# Patient Record
Sex: FEMALE | Race: WHITE | NOT HISPANIC OR LATINO | URBAN - NONMETROPOLITAN AREA
[De-identification: names, ages, dates, MRNs, and addresses within clinical notes are randomized per-mention and may not be internally consistent; named-entity substitution may affect disease eponyms.]

---

## 2023-04-04 ENCOUNTER — OFFICE (OUTPATIENT)
Dept: URBAN - NONMETROPOLITAN AREA CLINIC 1 | Facility: CLINIC | Age: 53
End: 2023-04-04

## 2023-04-04 VITALS
SYSTOLIC BLOOD PRESSURE: 138 MMHG | HEIGHT: 63 IN | SYSTOLIC BLOOD PRESSURE: 129 MMHG | WEIGHT: 245 LBS | DIASTOLIC BLOOD PRESSURE: 95 MMHG | HEART RATE: 95 BPM | DIASTOLIC BLOOD PRESSURE: 87 MMHG

## 2023-04-04 DIAGNOSIS — Z79.899 OTHER LONG TERM (CURRENT) DRUG THERAPY: ICD-10-CM

## 2023-04-04 DIAGNOSIS — R19.5 OTHER FECAL ABNORMALITIES: ICD-10-CM

## 2023-04-04 DIAGNOSIS — R10.10 UPPER ABDOMINAL PAIN, UNSPECIFIED: ICD-10-CM

## 2023-04-04 DIAGNOSIS — K22.70 BARRETT'S ESOPHAGUS WITHOUT DYSPLASIA: ICD-10-CM

## 2023-04-04 DIAGNOSIS — Z87.19 PERSONAL HISTORY OF OTHER DISEASES OF THE DIGESTIVE SYSTEM: ICD-10-CM

## 2023-04-04 DIAGNOSIS — K21.9 GASTRO-ESOPHAGEAL REFLUX DISEASE WITHOUT ESOPHAGITIS: ICD-10-CM

## 2023-04-04 DIAGNOSIS — Z76.0 ENCOUNTER FOR ISSUE OF REPEAT PRESCRIPTION: ICD-10-CM

## 2023-04-04 PROCEDURE — 99214 OFFICE O/P EST MOD 30 MIN: CPT | Performed by: NURSE PRACTITIONER

## 2023-04-04 RX ORDER — PANTOPRAZOLE 40 MG/1
TABLET, DELAYED RELEASE ORAL
Qty: 30 | Refills: 5 | Status: ACTIVE

## 2023-04-04 RX ORDER — FAMOTIDINE 40 MG/1
TABLET, FILM COATED ORAL
Qty: 60 | Refills: 5 | Status: ACTIVE

## 2023-04-21 ENCOUNTER — OFFICE (OUTPATIENT)
Dept: URBAN - NONMETROPOLITAN AREA CLINIC 1 | Facility: CLINIC | Age: 53
End: 2023-04-21

## 2023-04-21 DIAGNOSIS — R10.10 UPPER ABDOMINAL PAIN, UNSPECIFIED: ICD-10-CM

## 2023-04-21 DIAGNOSIS — K22.70 BARRETT'S ESOPHAGUS WITHOUT DYSPLASIA: ICD-10-CM

## 2023-04-21 DIAGNOSIS — K21.9 GASTRO-ESOPHAGEAL REFLUX DISEASE WITHOUT ESOPHAGITIS: ICD-10-CM

## 2023-04-21 PROCEDURE — 36415 COLL VENOUS BLD VENIPUNCTURE: CPT | Performed by: NURSE PRACTITIONER

## 2023-05-03 ENCOUNTER — ON CAMPUS - OUTPATIENT (OUTPATIENT)
Dept: URBAN - NONMETROPOLITAN AREA HOSPITAL 34 | Facility: HOSPITAL | Age: 53
End: 2023-05-03

## 2023-05-03 DIAGNOSIS — K76.0 FATTY (CHANGE OF) LIVER, NOT ELSEWHERE CLASSIFIED: ICD-10-CM

## 2023-05-03 PROCEDURE — 91200 LIVER ELASTOGRAPHY: CPT | Mod: 26 | Performed by: INTERNAL MEDICINE

## 2024-04-05 ENCOUNTER — OFFICE (OUTPATIENT)
Dept: URBAN - NONMETROPOLITAN AREA CLINIC 1 | Facility: CLINIC | Age: 54
End: 2024-04-05

## 2024-04-05 VITALS
SYSTOLIC BLOOD PRESSURE: 144 MMHG | HEIGHT: 63 IN | HEART RATE: 81 BPM | DIASTOLIC BLOOD PRESSURE: 87 MMHG | WEIGHT: 242 LBS

## 2024-04-05 DIAGNOSIS — Z79.899 OTHER LONG TERM (CURRENT) DRUG THERAPY: ICD-10-CM

## 2024-04-05 DIAGNOSIS — Z76.0 ENCOUNTER FOR ISSUE OF REPEAT PRESCRIPTION: ICD-10-CM

## 2024-04-05 DIAGNOSIS — K21.9 GASTRO-ESOPHAGEAL REFLUX DISEASE WITHOUT ESOPHAGITIS: ICD-10-CM

## 2024-04-05 DIAGNOSIS — K76.0 FATTY (CHANGE OF) LIVER, NOT ELSEWHERE CLASSIFIED: ICD-10-CM

## 2024-04-05 DIAGNOSIS — K22.70 BARRETT'S ESOPHAGUS WITHOUT DYSPLASIA: ICD-10-CM

## 2024-04-05 DIAGNOSIS — K58.9 IRRITABLE BOWEL SYNDROME WITHOUT DIARRHEA: ICD-10-CM

## 2024-04-05 PROCEDURE — 36415 COLL VENOUS BLD VENIPUNCTURE: CPT | Performed by: NURSE PRACTITIONER

## 2024-04-05 PROCEDURE — 99214 OFFICE O/P EST MOD 30 MIN: CPT | Performed by: NURSE PRACTITIONER

## 2024-04-05 RX ORDER — FAMOTIDINE 40 MG/1
TABLET, FILM COATED ORAL
Qty: 60 | Refills: 5 | Status: ACTIVE

## 2024-04-05 RX ORDER — PANTOPRAZOLE 40 MG/1
TABLET, DELAYED RELEASE ORAL
Qty: 30 | Refills: 5 | Status: ACTIVE

## 2025-04-08 ENCOUNTER — OFFICE (OUTPATIENT)
Dept: URBAN - NONMETROPOLITAN AREA CLINIC 1 | Facility: CLINIC | Age: 55
End: 2025-04-08
Payer: COMMERCIAL

## 2025-04-08 VITALS
DIASTOLIC BLOOD PRESSURE: 72 MMHG | HEART RATE: 81 BPM | WEIGHT: 216 LBS | SYSTOLIC BLOOD PRESSURE: 124 MMHG | HEIGHT: 63 IN

## 2025-04-08 DIAGNOSIS — K21.9 GASTRO-ESOPHAGEAL REFLUX DISEASE WITHOUT ESOPHAGITIS: ICD-10-CM

## 2025-04-08 DIAGNOSIS — R16.0 HEPATOMEGALY, NOT ELSEWHERE CLASSIFIED: ICD-10-CM

## 2025-04-08 DIAGNOSIS — K22.70 BARRETT'S ESOPHAGUS WITHOUT DYSPLASIA: ICD-10-CM

## 2025-04-08 DIAGNOSIS — R93.5 ABNORMAL FINDINGS ON DIAGNOSTIC IMAGING OF OTHER ABDOMINAL R: ICD-10-CM

## 2025-04-08 DIAGNOSIS — K76.0 FATTY (CHANGE OF) LIVER, NOT ELSEWHERE CLASSIFIED: ICD-10-CM

## 2025-04-08 PROCEDURE — 99214 OFFICE O/P EST MOD 30 MIN: CPT | Performed by: NURSE PRACTITIONER

## 2025-04-08 RX ORDER — FAMOTIDINE 40 MG/1
TABLET, FILM COATED ORAL
Qty: 90 | Refills: 3 | Status: ACTIVE

## 2025-04-08 RX ORDER — PANTOPRAZOLE 40 MG/1
TABLET, DELAYED RELEASE ORAL
Qty: 90 | Refills: 3 | Status: ACTIVE

## 2025-04-08 NOTE — SERVICENOTES
Labs as above for hepatic steatosis, chronic ppi use for barretts/gerd
refills prescribed above
MRI liver for abnormal finding on u/s of liver 5/2024- for completeness
Continue fatty liver guidelines and weight loss, control metabolic risk factors
Surveillance EGD for Barretts due 2026
Surveillance colonoscopy of colon polyps due 2026
f/u in 6 months

## 2025-04-08 NOTE — SERVICEHPINOTES
patient presents to the clinic today for follow up of fatty liver, barretts esopahgus, and gerd.  She was previously followed by VENKATESH Ray.  She has fatty liver in which last abd u/s 05/2024 revealed enlarged steatotic liver hypo echogenicity in the liver adjacent to the gallbladder may simply represent area of fatty sparing although MRI recommended.  Fibroscan 10/2024 S1F0/1.  LFTs and acute hepatitis panel negative to date.  Since denies abd pain, n/v/d, melena, hematochezia, or fever.  She has intentionally lost approximately 40lbs in the last year she reports -continue Mounjaro.    
br
br For Barretts and chronic gerd she takes Pantoprazole 40mg po qd and Famotidine 40mg po qd with relief.  She requests refills of both meds today.  Her last EGD was in 2021 outlined below, with recommendations to repeat in 5 years.  br
br10/2024- S1 mild steatosis. F0/1 none to mild fibrosis. (kPa 6.9). 
br
br   Per OV VENKATESH Ray 10/2024
br54 year old female presents for fu. After lov pt had labs and ultrasound of liver ordered. I received labs which lfts were wnl but ultrasound of the liver was not sent to me. she reports she had this done through [facility] in [place]. I was not sent this. Pt reports legally her name is spelled fei but she signs her name olga. she was under the hospital system as olga. she reports intentional weight loss since lov with diet and life style modifications. she was 242lbs at lov and today 214lbs. she has been on mounjaro for sometime and this does cause early satiety. she has seen dr darlene goss and he has modified some medications as well. she reports have a ct there and told some findings but cannot elaborate more. requested. denies jaundice, fever, or confusion. she has a hx of fibroscan from may 2023 cw advance steatosis without fibrosis. US from May 2024 did reveal enlarged liver. LFTs were wnl at that time. she has a hx of diabetes, htn, hld, and obestiy. denies hx of illicit drugs or heavy alcohol use. She reports contining to do well since regarding gerd symptoms since lov. she is on pantoprazole 40mg before breakfast daily and pepcid 40mg at bedtime. She will take before supper prn what she eats. If she eats late or spicy she will have hb but otherwise she has done well and not needed. She actually notes since lov she has not had to take the before supper dosing. She has hx of vaughn's esophagus with last egd without dysplasia from 2021. Dx in 2020. Denies dysphagia, unintentional weight loss, hoarseness, or chronic cough. denies abdominal pain. Reports hx of ibs alternating at times but more troubles with constipation. she has done fiber in the past but not at this time. She is having a bmqd but notes if she does randomly skip she will have some hb. denies blood in the stool rectal pain or bleeding. she reports needing gi meds refills.4/2023 lfts and acute hep panel negative.br4/2024 LFTs wnl. GI HISTORY:brDenies family history of colon polyps/cancer. Reports sister with Vaughn's esophagus. Mother with vaughn's esophagus.01/22/2020 EGD by Dr. Pink-Findings:brEsophagus:brGastroesophageal junction at 35 centimeters. A 1 centimeter tongue of Vaughn's appearing mucosa with two areas of isolated Vaughn's appearing mucosa approximately 1 centimeter from the GE junction. Multiple forcep biopsies were taken of this Vaughn's appearing mucosa. There was no evidence of active reflux induced changes, ulceration, mass, stricture, nodule, or varices.brGastroesophageal junction: 2 centimeters sliding hiatal herniabrStomach: Mild petechial type antral gastric inflammation. Forcep biopsies were taken from the antrum, incisura, and distal gastric body. No evidence of mass, ulceration, or varices.brPylorus: Normal.brDuodenum: Normal.brRecommendations: Follow up on biopsy results. Continue daily Protonix therapy. Recommend anti-reflux measures including not eating late at night and elevating the head of the bed. Recommend weight loss measures. I suspect patient's symptoms may be consistent with a globus sensation in the setting of likely reflux.brFinal Pathologic Diagnosis:br1. STOMACH, BIOPSY:brBenign gastric mucosa with reactive (chemical) gastropathy, a phase of acute erosive gastritis.brNo Helicobacter-like organisms identified by immunostain.br2. distal esophagus, biopsy:brBenign squamocolumnar mucosa with intestinal (Vaughn type) metaplasia negative for dysplasia.Evidence of Barretts esophagus without dysplasia. Recommend daily PPI therapy without cessation. Continue anti-reflux measures. Recommend repeat EGD in 1 year. H. pylori negative.1/15/20 occult stool negative x32/2021 egd by dr pink-Findings:br? Esophagus: Isolated 2 to 3 millimeter patch of Vaughn's appearing mucosa within the distal esophagus. Multiple forcep biopsies were taken of this location. Irregular Z-line. Otherwise remainder of the esophagus appears normal without evidence of mass, stricture, ulceration, reflux induced changes, or varices.br? Gastroesophageal junction: Gastroesophageal junction at 35 centimeters. 1 centimeter sliding hiatal hernia.br? Stomach: Mild striped inflammatory appearance of the antral mucosa. Otherwise remainder the stomach appeared normal without evidence of mass, ulceration, or varices.br? Pylorus: Normal. Patent.br? Duodenum: Normal.2/2021 colonoscopy by dr pink- good prep. Normal rectal exam.Findings: 5 millimeter broad-based transverse colon polyp removed with cold snare technique and retrieved. Otherwise remainder the colon appeared normal. Normal vascular pattern without evidence of significant diverticular disease or mass.Final Pathologic Diagnosis:br1. esophagus, biopsies:brStratified squamous and glandular mucosa with intestinal (Vaughn type) metaplasia, negative for dysplasia.br2. transverse colon, polyp, endoscopic biopsy:brTubular adenoma.brRepeat colonoscopy and egd in 5 years. Continue anti-reflux measures and daily PPI therapy.4/20/23 HIDA EF 58%4/20/23 US-FINDINGS: Increased hepatic echogenicity is identified with diminished sonographic through transmission, suggesting hepatic steatosis. Pancreas is poorly visualized due to overlying bowel gas. Portal venous flow is identified and is hepatopetal. There is no sonographic Key's sign. There is no gallstone. There is no gallbladder wall thickening. Common bile duct measures 4 mm. IMPRESSION: 1. Abnormal appearance of the liver parenchyma, compatible with hepatic steatosis (fatty infiltration of the liver). Potential etiologies include obesity, nonalcoholic steatohepatitis (RODRIGUEZ), alcohol related liver disease, diabetes mellitus, hyperlipidemia, fulminant hepatitis, and hepatic toxins. Follow-up evaluation may be pursued if indicated by clinical history and hepatic serologies. 2. No intrahepatic or extrahepatic biliary duct dilatation 3. No sonographic evidence for cholecystolithiasis or acute cholecystitis4/2023 acute hep panel neg. lfts wnl.5/2023 fibroscan- no fibrosis with advance steatosis.5/2024 US-brFINDINGS:brPancreas is unremarkable as visualized.brLiver size is enlarged, exceeding 20 cm. Echotexture is increased.brThere is an area of hypoechogenicity in the liver adjacent to thebrgallbladder..Antegrade flow is present in the portal vein.brGallbladder wall is not thickened. No stones are present.Key signbris not present.brCBD measures 3mm.brAscites is not present.IMPRESSION:brEnlarged, steatotic liverbrHypoechogenicity in the liver adjacent to the gallbladder may simplybrrepresent an area of fatty sparing, though MRI correlation isbrrecommended